# Patient Record
Sex: FEMALE | Race: OTHER | NOT HISPANIC OR LATINO | Employment: FULL TIME | URBAN - METROPOLITAN AREA
[De-identification: names, ages, dates, MRNs, and addresses within clinical notes are randomized per-mention and may not be internally consistent; named-entity substitution may affect disease eponyms.]

---

## 2021-09-18 ENCOUNTER — APPOINTMENT (OUTPATIENT)
Dept: RADIOLOGY | Facility: CLINIC | Age: 41
End: 2021-09-18
Payer: COMMERCIAL

## 2021-09-18 ENCOUNTER — OFFICE VISIT (OUTPATIENT)
Dept: URGENT CARE | Facility: CLINIC | Age: 41
End: 2021-09-18
Payer: COMMERCIAL

## 2021-09-18 VITALS
RESPIRATION RATE: 16 BRPM | HEART RATE: 87 BPM | TEMPERATURE: 97.5 F | BODY MASS INDEX: 25.49 KG/M2 | DIASTOLIC BLOOD PRESSURE: 83 MMHG | SYSTOLIC BLOOD PRESSURE: 119 MMHG | HEIGHT: 61 IN | WEIGHT: 135 LBS | OXYGEN SATURATION: 98 %

## 2021-09-18 DIAGNOSIS — R68.89 FLU-LIKE SYMPTOMS: ICD-10-CM

## 2021-09-18 DIAGNOSIS — S82.65XA CLOSED NONDISPLACED FRACTURE OF LATERAL MALLEOLUS OF LEFT FIBULA, INITIAL ENCOUNTER: Primary | ICD-10-CM

## 2021-09-18 PROCEDURE — 99213 OFFICE O/P EST LOW 20 MIN: CPT | Performed by: PHYSICIAN ASSISTANT

## 2021-09-18 PROCEDURE — 73610 X-RAY EXAM OF ANKLE: CPT

## 2021-09-18 PROCEDURE — 96372 THER/PROPH/DIAG INJ SC/IM: CPT | Performed by: PHYSICIAN ASSISTANT

## 2021-09-18 PROCEDURE — 29515 APPLICATION SHORT LEG SPLINT: CPT | Performed by: PHYSICIAN ASSISTANT

## 2021-09-18 RX ORDER — KETOROLAC TROMETHAMINE 30 MG/ML
30 INJECTION, SOLUTION INTRAMUSCULAR; INTRAVENOUS ONCE
Status: COMPLETED | OUTPATIENT
Start: 2021-09-18 | End: 2021-09-18

## 2021-09-18 RX ADMIN — KETOROLAC TROMETHAMINE 30 MG: 30 INJECTION, SOLUTION INTRAMUSCULAR; INTRAVENOUS at 15:49

## 2021-09-18 NOTE — PATIENT INSTRUCTIONS
Nonweightbearing  Crutches for ambulation  Over-the-counter Tylenol and ibuprofen for pain  Elevate extremity  Make appoint with orthopedic surgery  Go to ER if symptoms become severe  Ankle Fracture   WHAT YOU NEED TO KNOW:   An ankle fracture is a break in 1 or more of the bones in your ankle  DISCHARGE INSTRUCTIONS:   Call your local emergency number (911 in the [address]) for any of the following:   · You feel lightheaded, short of breath, and have chest pain  · You cough up blood  Return to the emergency department if:   · Your leg feels warm, tender, and painful  It may look swollen and red  · Blood soaks through your bandage  · You have severe pain in your ankle  · Your cast feels too tight  · Your foot or toes are cold or numb  · Your foot or toenails turn blue or gray  Call your doctor if:   · Your splint feels too tight  · Your swelling has increased or returned  · You have a fever  · Your pain does not go away, even after treatment  · You have questions or concerns about your condition or care  Medicines: You may need any of the following:  · Acetaminophen  decreases pain and fever  It is available without a doctor's order  Ask how much to take and how often to take it  Follow directions  Read the labels of all other medicines you are using to see if they also contain acetaminophen, or ask your doctor or pharmacist  Acetaminophen can cause liver damage if not taken correctly  Do not use more than 4 grams (4,000 milligrams) total of acetaminophen in one day  · NSAIDs , such as ibuprofen, help decrease swelling, pain, and fever  This medicine is available with or without a doctor's order  NSAIDs can cause stomach bleeding or kidney problems in certain people  If you take blood thinner medicine, always ask your healthcare provider if NSAIDs are safe for you  Always read the medicine label and follow directions  · Prescription pain medicine  may be given  Ask your healthcare provider how to take this medicine safely  Some prescription pain medicines contain acetaminophen  Do not take other medicines that contain acetaminophen without talking to your healthcare provider  Too much acetaminophen may cause liver damage  Prescription pain medicine may cause constipation  Ask your healthcare provider how to prevent or treat constipation  · Take your medicine as directed  Contact your healthcare provider if you think your medicine is not helping or if you have side effects  Tell him or her if you are allergic to any medicine  Keep a list of the medicines, vitamins, and herbs you take  Include the amounts, and when and why you take them  Bring the list or the pill bottles to follow-up visits  Carry your medicine list with you in case of an emergency  Follow up with your doctor in 1 to 2 days: Your fracture may need to be reduced (bones pushed back into place) or you may need surgery  Write down your questions so you remember to ask them during your visits  Support devices: You will be given a brace, cast, or splint to limit your movement and protect your ankle  You may need to use crutches to protect your ankle and decrease your pain as you move around  Do not remove your device and do not put weight on your injured ankle  Splint and cast care:  Cover the splint or cast before you bathe so it does not get wet  Tape 2 plastic trash bags to your skin above the cast  Try to keep your ankle out of the water as much as possible  Rest:  Rest your ankle so that it can heal  Return to normal activities as directed  Ice:  Apply ice on your ankle for 15 to 20 minutes every hour or as directed  Use an ice pack, or put crushed ice in a plastic bag  Cover it with a towel  Ice helps prevent tissue damage and decreases swelling and pain  Elevate:  Elevate your ankle above the level of your heart as often as you can  This will help decrease swelling and pain   Prop your ankle on pillows or blankets to keep it elevated comfortably  © Copyright [employer] 2021 Information is for End User's use only and may not be sold, redistributed or otherwise used for commercial purposes  All illustrations and images included in CareNotes® are the copyrighted property of [employer]  or Ramon Mendoza  The above information is an  only  It is not intended as medical advice for individual conditions or treatments  Talk to your doctor, nurse or pharmacist before following any medical regimen to see if it is safe and effective for you

## 2021-09-18 NOTE — PROGRESS NOTES
[facility]        NAME: Claudia Ramos is a 45 y o  female  : 1980    MRN: [id number]  DATE: 2021  TIME: 4:24 PM    Assessment and Plan   Closed nondisplaced fracture of lateral malleolus of left fibula, initial encounter [S82 [id number]]  1  Closed nondisplaced fracture of lateral malleolus of left fibula, initial encounter  XR ankle 3+ vw left    Ambulatory referral to Orthopedic Surgery    ketorolac (TORADOL) injection 30 mg    Splint application         Patient Instructions   Rest, ice, compression, elevation  Over-the-counter Tylenol and ibuprofen for pain  Ambulation and activities as tolerated  Physical therapy  Orthopedic surgery if symptoms are not improving  Follow up with PCP in 3-5 days  Proceed to  ER if symptoms worsen  Chief Complaint     Chief Complaint   Patient presents with    Ankle Injury     twisted ankle in stone about an hour ago  left ankle pain         History of Present Illness        Patient is a 29-year-old female with no significant past medical history presents the office complaining of left ankle pain after inversion injury approximately 1 hour ago  Pain is located to the entire ankle but most prominent at the lateral aspect  Pain is rated 10/10 and reports she has been unable to bear weight after the incident  Denies any prior significant injuries to the ankle  Do not take anything for pain  Review of Systems   Review of Systems   Musculoskeletal: Positive for arthralgias and joint swelling  Neurological: Negative for numbness  Current Medications     No current outpatient medications on file  No current facility-administered medications for this visit      Current Allergies     Allergies as of 2021    (No Known Allergies)            The following portions of the patient's history were reviewed and updated as appropriate: allergies, current medications, past family history, past medical history, past social history, past surgical history and problem list      Past Medical History:   Diagnosis Date    Known health problems: none        Past Surgical History:   Procedure Laterality Date     SECTION         History reviewed  No pertinent family history  Medications have been verified  Objective   /83   Pulse 87   Temp 97 5 °F (36 4 °C)   Resp 16   Ht 5' 1" (1 549 m)   Wt 61 2 kg (135 lb)   LMP 2021   SpO2 98%   BMI 25 51 kg/m²   Patient's last menstrual period was 2021  Physical Exam     Physical Exam  Vitals and nursing note reviewed  Constitutional:       Appearance: She is well-developed  HENT:      Head: Normocephalic and atraumatic  Right Ear: External ear normal       Left Ear: External ear normal       Nose: Nose normal    Eyes:      General: Lids are normal       Conjunctiva/sclera: Conjunctivae normal    Musculoskeletal:      Left ankle: Swelling present  No deformity or ecchymosis  Tenderness present over the lateral malleolus, ATF ligament, AITF ligament and CF ligament  No medial malleolus, posterior TF ligament, base of 5th metatarsal or proximal fibula tenderness  Decreased range of motion  Skin:     General: Skin is warm and dry  Capillary Refill: Capillary refill takes less than 2 seconds  Neurological:      Mental Status: She is alert  Left ankle sprain:   Possible distal fibular fracture  Radiology interpretation pending  Splint application    Date/Time: 2021 3:46 PM  Performed by: Derrick Kuhn PA-C  Authorized by: Derrick Kuhn PA-C   [place] Protocol:  Procedure performed by: Mayra Holt)  Consent: Verbal consent obtained  Risks and benefits: risks, benefits and alternatives were discussed  Consent given by: patient  Time out: Immediately prior to procedure a "time out" was called to verify the correct patient, procedure, equipment, support staff and site/side marked as required    Timeout called at: 9/18/2021 3:47 PM   Patient understanding: patient states understanding of the procedure being performed  Patient consent: the patient's understanding of the procedure matches consent given      Pre-procedure details:     Sensation:  Normal  Procedure details:     Laterality:  Left    Location:  Ankle    Ankle:  L ankle    Splint type:  Short leg    Supplies:  Cotton padding, Ortho-Glass and elastic bandage (+crutches)  Post-procedure details:     Pain:  Unchanged    Sensation:  Normal    Patient tolerance of procedure:   Tolerated well, no immediate complications

## 2021-09-18 NOTE — LETTER
September 18, 2021     Patient: Loan Madison   YOB: 1980   Date of Visit: 9/18/2021       To Whom It May Concern: It is my medical opinion that Loan Madison should remain out of work until cleared by [employer]               Sincerely,        Dutch Hudson PA-C

## 2021-09-22 ENCOUNTER — HOSPITAL ENCOUNTER (OUTPATIENT)
Dept: RADIOLOGY | Facility: CLINIC | Age: 41
Discharge: HOME | End: 2021-09-22
Payer: COMMERCIAL

## 2021-09-22 ENCOUNTER — OFFICE VISIT (OUTPATIENT)
Dept: OBGYN CLINIC | Facility: CLINIC | Age: 41
End: 2021-09-22
Payer: COMMERCIAL

## 2021-09-22 VITALS
SYSTOLIC BLOOD PRESSURE: 110 MMHG | WEIGHT: 135 LBS | HEART RATE: 80 BPM | HEIGHT: 61 IN | DIASTOLIC BLOOD PRESSURE: 72 MMHG | TEMPERATURE: 98.2 F | BODY MASS INDEX: 25.49 KG/M2

## 2021-09-22 DIAGNOSIS — M25.572 ACUTE LEFT ANKLE PAIN: Primary | ICD-10-CM

## 2021-09-22 DIAGNOSIS — S82.65XA CLOSED NONDISPLACED FRACTURE OF LATERAL MALLEOLUS OF LEFT FIBULA, INITIAL ENCOUNTER: ICD-10-CM

## 2021-09-22 PROCEDURE — 99203 OFFICE O/P NEW LOW 30 MIN: CPT | Performed by: ORTHOPAEDIC SURGERY

## 2021-09-22 PROCEDURE — 27786 TREATMENT OF ANKLE FRACTURE: CPT | Performed by: ORTHOPAEDIC SURGERY

## 2021-09-22 PROCEDURE — 73610 X-RAY EXAM OF ANKLE: CPT

## 2021-09-22 RX ORDER — MELATONIN
1000 DAILY
COMMUNITY

## 2021-09-22 NOTE — PROGRESS NOTES
ASSESSMENT/PLAN:    Diagnoses and all orders for this visit:    Acute left ankle pain    Closed nondisplaced fracture of lateral malleolus of left fibula, initial encounter  -     Ambulatory referral to Orthopedic Surgery  -     Cam Boot  -     XR ankle 3+ vw left; Future    Other orders  -     cholecalciferol (VITAMIN D3) 1,000 units tablet; Take 1,000 Units by mouth daily        Plan:  Treatment options were discussed  After a thorough discussion of the options, she elected to proceed with walker cast boot immobilization  This is to remain in place  She is permitted to bear weight as tolerated  She was provided with a note to remain out of work  I will see her in 1 week for re-evaluation with x-rays obtained out of the cast boot  She is to contact the office if questions or concerns arise in the interim  Precautions have been reviewed, instructions provided and questions answered  Return in about 1 week (around 9/29/2021)  _____________________________________________________  CHIEF COMPLAINT:  Chief Complaint   Patient presents with    Left Ankle - Fracture         SUBJECTIVE:  Katharina Rey is a 45y o  year old female who presents for evaluation of her left ankle injured on 09/18/2021  She suffered an inversion injury outside of her home just prior to heading to work  She was able to go to work, work for a period of time but had increasing pain and was seen at the [facility] where she was evaluated, x-rays were obtained and she was placed into a splint  She was instructed to seek orthopedic follow-up, instructed to be nonweightbearing and now presents for orthopedic care  She denies paresthesias  She complains of lateral pain  She denies any history of prior injuries and denies any additional injuries  She has remained out of work        PAST MEDICAL HISTORY:  Past Medical History:   Diagnosis Date    Known health problems: none        PAST SURGICAL HISTORY:  Past Surgical History:   Procedure Laterality Date     SECTION         FAMILY HISTORY:  Family History   Problem Relation Age of Onset    No Known Problems Mother     No Known Problems Father        SOCIAL HISTORY:  Social History     Tobacco Use    Smoking status: Never Smoker    Smokeless tobacco: Never Used   Vaping Use    Vaping Use: Never used   Substance Use Topics    Alcohol use: Never    Drug use: Never       MEDICATIONS:    Current Outpatient Medications:     cholecalciferol (VITAMIN D3) 1,000 units tablet, Take 1,000 Units by mouth daily, Disp: , Rfl:     ALLERGIES:  No Known Allergies    Review of systems:   Constitutional: Negative for fatigue, fever or loss of apetite  HENT: Negative  Respiratory: Negative for shortness of breath, dyspnea  Cardiovascular: Negative for chest pain/tightness  Gastrointestinal: Negative for abdominal pain, N/V  Endocrine: Negative for cold/heat intolerance, unexplained weight loss/gain  Genitourinary: Negative for flank pain, dysuria, hematuria  Musculoskeletal:  Positive as in the HPI   Skin: Negative for rash  Neurological:  Negative  Psychiatric/Behavioral: Negative for agitation  _____________________________________________________  PHYSICAL EXAMINATION:    Blood pressure 110/72, pulse 80, temperature 98 2 °F (36 8 °C), height 5' 1" (1 549 m), weight 61 2 kg (135 lb), last menstrual period 2021  General: well developed and well nourished, alert, oriented times 3 and appears comfortable  Psychiatric: Normal  HEENT: Benign  Cardiovascular: Regular    Pulmonary: No wheezing or stridor  Abdomen: Soft, Nontender  Skin: No masses, erythema, lacerations, fluctation, ulcerations  Neurovascular: Motor and sensory exams are grossly intact although strength is somewhat limited at the left ankle consistent with her injury  Pulses are palpable  MUSCULOSKELETAL EXAMINATION:    The left ankle exam demonstrates the skin to be warm and dry    There is mild lateral swelling without ecchymosis  She has significant tenderness to palpation of the distal fibula  She denied tenderness to palpation of the lateral ankle ligaments  The distal tibia and deltoid ligament are nontender  She does demonstrate active dorsiflexion, plantar flexion, inversion and eversion but does complain of lateral ankle pain with range of motion  Syndesmotic compression is negative  She has no tenderness over the tibial or fibular shaft  The remainder of the lower extremity examination bilaterally is benign  _____________________________________________________  STUDIES REVIEWED:  X-rays of the ankle dated 09/18/2021 demonstrated a nondisplaced distal fibular fracture  Repeat x-rays were obtained today demonstrating that the fracture remains in good position with fracture line being barely visible  There is no evidence of ankle joint widening  The initial x-ray report was reviewed  The [facility] note was reviewed  PROCEDURES:  Fracture / Dislocation Treatment    Date/Time: 9/22/2021 12:04 PM  Performed by: Fito Michaud  Authorized by: Fito Michaud     Patient Location:  Clinic  Verbal consent obtained?: Yes    Written consent obtained?: No    Risks and benefits: Risks, benefits and alternatives were discussed    Consent given by:  Patient  Patient states understanding of procedure being performed: Yes    Test results available and properly labeled: Yes    Radiology Images displayed and confirmed   If images not available, report reviewed: Yes    Injury location:  Ankle  Location details:  Left ankle  Injury type:  Fracture  Fracture type: lateral malleolus    Fracture type: lateral malleolus    Neurovascular status: Neurovascularly intact    Local anesthesia used?: No    Manipulation performed?: No    Immobilization:  Other (comment) (Cam boot)  Neurovascular status: Neurovascularly intact    Patient tolerance:  Patient tolerated the procedure well with no immediate complications          Danny Jiang

## 2021-09-22 NOTE — LETTER
September 22, 2021     Patient: Loan Madison   YOB: 1980   Date of Visit: 9/22/2021       To Whom it May Concern:    Loan Madison is under my professional care  She was seen in my office on 9/22/2021  She May not return to work until she is rechecked in 1 week and further determinations are made  If you have any questions or concerns, please don't hesitate to call           Sincerely,          James Rankin        CC: Loan Hernandezion

## 2021-09-29 ENCOUNTER — HOSPITAL ENCOUNTER (OUTPATIENT)
Dept: RADIOLOGY | Facility: CLINIC | Age: 41
Discharge: HOME | End: 2021-09-29
Payer: COMMERCIAL

## 2021-09-29 ENCOUNTER — OFFICE VISIT (OUTPATIENT)
Dept: OBGYN CLINIC | Facility: CLINIC | Age: 41
End: 2021-09-29

## 2021-09-29 VITALS
HEIGHT: 61 IN | WEIGHT: 135 LBS | DIASTOLIC BLOOD PRESSURE: 68 MMHG | SYSTOLIC BLOOD PRESSURE: 108 MMHG | HEART RATE: 83 BPM | TEMPERATURE: 98.1 F | BODY MASS INDEX: 25.49 KG/M2

## 2021-09-29 DIAGNOSIS — S82.65XD CLOSED NONDISPLACED FRACTURE OF LATERAL MALLEOLUS OF LEFT FIBULA WITH ROUTINE HEALING, SUBSEQUENT ENCOUNTER: Primary | ICD-10-CM

## 2021-09-29 DIAGNOSIS — S82.65XD CLOSED NONDISPLACED FRACTURE OF LATERAL MALLEOLUS OF LEFT FIBULA WITH ROUTINE HEALING, SUBSEQUENT ENCOUNTER: ICD-10-CM

## 2021-09-29 PROCEDURE — 99024 POSTOP FOLLOW-UP VISIT: CPT | Performed by: ORTHOPAEDIC SURGERY

## 2021-09-29 PROCEDURE — 73610 X-RAY EXAM OF ANKLE: CPT

## 2021-09-29 RX ORDER — IBUPROFEN 200 MG
600 TABLET ORAL EVERY 6 HOURS PRN
COMMUNITY

## 2021-09-29 NOTE — LETTER
September 29, 2021     Patient: Mckenzie Pedroza   YOB: 1980   Date of Visit: 9/29/2021       To Whom it May Concern:    Mckenzie Pedroza is under my professional care  She was seen in my office on 9/29/2021  She may return to work on 10/04/2021  She must be permitted to wear the walker cast boot while working and should be allowed intermittent sitting, standing and walking as tolerated  If you have any questions or concerns, please don't hesitate to call           Sincerely,          Stef Courser        CC: Mckenzie Pedroza

## 2021-09-29 NOTE — PROGRESS NOTES
Patient Name:  Sunil Orozco  MRN:  [id number]    Assessment     1  Closed nondisplaced fracture of lateral malleolus of left fibula with routine healing, subsequent encounter  XR ankle 3+ vw left       Plan     1  I would recommend follow-up in 3 weeks  She is to continue with the walker cast boot but is permitted weight-bearing as tolerated  I would allow her to return to work with restrictions as written and a note was provided  X-rays will be obtained at follow-up  I would expect that she can be transition from the walker cast boot to a brace at follow-up  She was encouraged to contact me if questions or concerns arise  Return in about 3 weeks (around 10/20/2021)  Subjective   Sunil Orozco returns for follow-up of  Her distal left fibular fracture  The patient is 11 day(s) post  injury and returns for routine follow-up  Patient complains of some persistent pain but has noted improvement in her symptoms  She has been  Able to begin to bear weight on the left lower extremity with her boot in place  Objective     /68   Pulse 83   Temp 98 1 °F (36 7 °C)   Ht 5' 1" (1 549 m)   Wt 61 2 kg (135 lb)   LMP 09/11/2021   BMI 25 51 kg/m²       Exam today demonstrates the boot in place upon arrival   This was removed without difficulty  There is no significant swelling  She continues to have tenderness of the distal fibula  Without localized tenderness to the lateral ankle ligaments, anteriorly, posteriorly or medially about the ankle  Sensation is intact  Good color and capillary refill is noted  No deformity is noted  Data Review     I have personally reviewed pertinent films in PACS   Demonstrating no clear evidence of fracture of the distal fibula      Micah Herrera

## 2021-10-20 ENCOUNTER — HOSPITAL ENCOUNTER (OUTPATIENT)
Dept: RADIOLOGY | Facility: CLINIC | Age: 41
Discharge: HOME | End: 2021-10-20
Payer: COMMERCIAL

## 2021-10-20 ENCOUNTER — OFFICE VISIT (OUTPATIENT)
Dept: OBGYN CLINIC | Facility: CLINIC | Age: 41
End: 2021-10-20

## 2021-10-20 VITALS
HEART RATE: 70 BPM | BODY MASS INDEX: 25.49 KG/M2 | WEIGHT: 135 LBS | HEIGHT: 61 IN | TEMPERATURE: 97.4 F | SYSTOLIC BLOOD PRESSURE: 110 MMHG | DIASTOLIC BLOOD PRESSURE: 76 MMHG

## 2021-10-20 DIAGNOSIS — S82.65XD CLOSED NONDISPLACED FRACTURE OF LATERAL MALLEOLUS OF LEFT FIBULA WITH ROUTINE HEALING, SUBSEQUENT ENCOUNTER: ICD-10-CM

## 2021-10-20 DIAGNOSIS — S82.65XD CLOSED NONDISPLACED FRACTURE OF LATERAL MALLEOLUS OF LEFT FIBULA WITH ROUTINE HEALING, SUBSEQUENT ENCOUNTER: Primary | ICD-10-CM

## 2021-10-20 PROCEDURE — 99024 POSTOP FOLLOW-UP VISIT: CPT | Performed by: ORTHOPAEDIC SURGERY

## 2021-10-20 PROCEDURE — 73610 X-RAY EXAM OF ANKLE: CPT
